# Patient Record
Sex: FEMALE | Race: WHITE | NOT HISPANIC OR LATINO | Employment: OTHER | URBAN - METROPOLITAN AREA
[De-identification: names, ages, dates, MRNs, and addresses within clinical notes are randomized per-mention and may not be internally consistent; named-entity substitution may affect disease eponyms.]

---

## 2023-12-14 DIAGNOSIS — F42.2 MIXED OBSESSIONAL THOUGHTS AND ACTS: ICD-10-CM

## 2023-12-14 DIAGNOSIS — F42.4 SKIN-PICKING DISORDER: ICD-10-CM

## 2023-12-14 RX ORDER — MEMANTINE HYDROCHLORIDE 28 MG/1
28 CAPSULE, EXTENDED RELEASE ORAL DAILY
Qty: 90 CAPSULE | Refills: 0 | Status: SHIPPED | OUTPATIENT
Start: 2023-12-14 | End: 2024-03-04

## 2023-12-14 RX ORDER — MEMANTINE HYDROCHLORIDE 28 MG/1
28 CAPSULE, EXTENDED RELEASE ORAL DAILY
COMMUNITY
Start: 2023-11-20 | End: 2023-12-14 | Stop reason: SDUPTHER

## 2024-01-23 ENCOUNTER — APPOINTMENT (OUTPATIENT)
Dept: BEHAVIORAL HEALTH | Facility: CLINIC | Age: 69
End: 2024-01-23
Payer: COMMERCIAL

## 2024-02-08 ENCOUNTER — TELEPHONE (OUTPATIENT)
Dept: BEHAVIORAL HEALTH | Facility: CLINIC | Age: 69
End: 2024-02-08
Payer: COMMERCIAL

## 2024-02-08 DIAGNOSIS — F41.1 GAD (GENERALIZED ANXIETY DISORDER): ICD-10-CM

## 2024-02-08 RX ORDER — ESCITALOPRAM OXALATE 10 MG/1
TABLET ORAL
COMMUNITY
Start: 2023-03-03 | End: 2024-02-08 | Stop reason: SDUPTHER

## 2024-02-08 RX ORDER — ESCITALOPRAM OXALATE 20 MG/1
20 TABLET ORAL
COMMUNITY
Start: 2011-08-17 | End: 2024-02-08 | Stop reason: SDUPTHER

## 2024-02-10 RX ORDER — ESCITALOPRAM OXALATE 10 MG/1
TABLET ORAL
Qty: 30 TABLET | Refills: 6 | Status: SHIPPED | OUTPATIENT
Start: 2024-02-10 | End: 2024-03-12 | Stop reason: SDUPTHER

## 2024-02-10 RX ORDER — ESCITALOPRAM OXALATE 20 MG/1
20 TABLET ORAL
Qty: 30 TABLET | Refills: 6 | Status: SHIPPED | OUTPATIENT
Start: 2024-02-10 | End: 2024-03-12 | Stop reason: SDUPTHER

## 2024-03-01 DIAGNOSIS — F42.4 SKIN-PICKING DISORDER: ICD-10-CM

## 2024-03-01 DIAGNOSIS — F42.2 MIXED OBSESSIONAL THOUGHTS AND ACTS: ICD-10-CM

## 2024-03-04 RX ORDER — MEMANTINE HYDROCHLORIDE 28 MG/1
CAPSULE, EXTENDED RELEASE ORAL
Qty: 90 CAPSULE | Refills: 0 | Status: SHIPPED | OUTPATIENT
Start: 2024-03-04

## 2024-03-11 ENCOUNTER — OFFICE VISIT (OUTPATIENT)
Dept: BEHAVIORAL HEALTH | Facility: CLINIC | Age: 69
End: 2024-03-11
Payer: COMMERCIAL

## 2024-03-11 DIAGNOSIS — F42.4 SKIN-PICKING DISORDER: ICD-10-CM

## 2024-03-11 DIAGNOSIS — F41.1 GAD (GENERALIZED ANXIETY DISORDER): Primary | ICD-10-CM

## 2024-03-11 PROCEDURE — 99215 OFFICE O/P EST HI 40 MIN: CPT | Performed by: PSYCHIATRY & NEUROLOGY

## 2024-03-11 NOTE — PROGRESS NOTES
"Patient Discussion/Summary    Both patient and staff caregivers were provided information re medications, including benefits, potential risks, and expected and unexpected side effects.      Provider Impressions    DIAGNOSES     OCD                 Depression, NEC       Skin picking       ID moderate                PRESENT FOR APPOINTMENT          Patient     Caregiver Tawnya Cordero, who knows her since October 2023    Sister and LETY Awad Roya    Chief Complaint    An interactive audio and video telecommunication system which permits real time communications between the patient (at the originating site) and provider (at the distant site) was utilized to provide this telehealth service.    Verbal consent was requested and obtained from guardian of CHEKO PACHECO on this date    \"She is no better.\"    \"She will have a whole new staff.\"               SUBJECTIVE     Last seen September 2023     Continued a trial of memantine for skin picking. Titrated over one month to current dose. Has taken memantine ER 28 mg per day for last 6 months    PCP reduced Plavix dose to every other day    No other changes in medications since last visit.      Current medications include:    ASA 81 mg per day  Atorvastatin 40 mg per day  Docusate Na 100 mg per day  Escitalopram 30 mg per day in morning  Memantine ER 28 mg per day in evening  Metformin 1000 mg twice per day  Metoprolol tartrate 25 mg twice per day   Plavix 75 mg every other day in the morning  Fiber choice (gummies), three per day    Vitamin D 2000 IU per day  MVI one per day    Alleve XL cream for hands and particularly one finger    Acetaminophen 650 mg prn, takes several times per week for back pain    Meclizine 25 mg three times per day prn for vertigo symptoms (rarely used)    Zofran 4 mg prn nausea. (none in interim)      Her sister reports, that since the addition of memantine, her skin picking is overall unchanged.    She does not think there has been any " "improvement since taking memantine.    She continues to pick at her cuticles nearly every day, has been picking at them with her other fingers, but also with instruments (paperclips, silverware, etc.).     Since changing her residential agency, she is no better, and perhaps modestly worse.   Seems irritable and angry much of the time.   Much blaming of others when she is not happy, stating that staff caregivers are not helping her, or are aggravating her.   Has \"shorter fuse\" and flares to anger more often and with less obvious frustration.    She has been shouting at her co-workers  many days.   Loses her temper regularly, both at home and at work.   Her day program has begun a reward program with her, in which Melissa can earn a star each day for not doing this yelling.   If she earns a star each day, she is rewarded with a trip with staff into the community for coffee.   She has been working for these rewards for the last couple of weeks.   Surrounding her behavioral challenges at the day program, there was discussion of decreasing her attendance to 4 days per week.  Her sister has resisted this plan, noting the Melissa is likely to spend the day off arguing with her residential staff instead.    There is hope of instituting a similar rewards program at her residence as well.        Agitated episodes include shouting, swearing, and sometimes threats to get others in trouble.  No specific threats to harm others or herself.   Even with this extra and more frequent shouting and agitation, she has not been directly aggressive to others.   Sometimes throws objects in anger, but typically not at others.   Has broken objects (headphones and earbuds; broke portable DVD player).       No SIB except picking at nails, which seems associated with anxiety and not intended to harm himself.     Her sister notes that Melissa's new residential provider ([facility]) is more likely to do things for her, and that as a result, she is " "sometimes doing fewer things for herself, and does not have the structure of her chores at home to help organize her day.   She has 6 staff caregivers through [employer], all female.   There are three primary caregivers, and three \"fill-in\" providers.      She is sleeping more lately, going to bed earlier, sometimes by 6 pm.    Wakes between 4-5 am many days.  Eats breakfast and returns to bed and sleeps until 7 am.   Taking occasional nap at day program.   On weekends, will sleep until 8 am.   Naps on weekend days from 2-5 pm.       Appetite is good to excessive, and she has gained weight in interim.   Less active.  Has been moving about less, and seems to have less stamina lately when walking.     No tears or overt sadness. No recent crying spells.     No other new paranoia or psychotic symptoms.     No change in her ritualistic or perseverative behavior.    Continues ongoing obsessive worry about many issues, including next meals, or when waiting for anything (next staff coming, when can do laundry, etc.). Sometimes will threaten to not take meds, or eat, or attend medical appts when anxious. This is unchanged in interim.     Her Depends are sometimes wet in the night, but no wet beds or clothing.    Continues wearing Depends 24 hours per day. Still needs help with bowel hygiene. Often does not identify to staff that she needs help with wiping.    She has partial dentures, but has been wearing less frequently or not at all lately.   Sister promotes their use, and she typically will wear if there is a planned activity into community, and particularly when she is visited by her sisters.     REVIEW OF SYMPTOMS    Had interim visit with PCP Dr. Shields in December 2023.    Routine check up.  No change in medications.        Has had COVID booster.        Had interim visit with Dr. Rowell in September 2023.   Plavix dose changed to every other day.     No recent reports of dizziness.     Has been seen by dentist in " interim--had one restoration.     Sugars (non fasting) are checked three times per week, have been in 100 to 130 range     Had interim visit with podiatrist, for nail care.     Has had interim ED  visit at [facility], for chest pain in early Feb 2024.   Had had stressful day at work.   Had EKG and labs, all normal.         Had urgent care visit surrounding cough, strep test and COVID tests were all negative.   Had Rx for nebulizer treatments for a week, improved.      Has continued picking and biting her fingernails and cuticles.   Sometimes will pick with a knife or fork.    Sometimes nails are reddened as if infected.   Has sometimes been wearing plastic gloves at work  to deter picking.   Team has also asked behavioral specialist from [facility] to assist in treatment plan     Had right wrist carpal tunnel surgery in July 2022. Had some struggles in post-op period with disability, trouble eating, getting dressed and in/out of bed.   Fewer recent reports of numbness in fingers, with previous treatment with Alleve XL cream.      Some recent reports of back pain over last week.    Some  recent use of OTC acetaminophen and topical cream.     Has been walking less lately; not as active.    No interim ophthal visit, checking for diabetic changes. None seen on last visit. Has small cataracts. Pressure was normal at last visit. Has glasses but does not wear.     Has gained weight since last visit.  Recent weight 213.6 pounds.   Ten pound weight increase since Sept 2023.      Had GI exam in September 2021 surrounding ongoing mild constipation.   Takes docusate and fiber daily, and can take PEG as needed for reported constipation.   Had episode of loose stools earlier in 2024.   Wearing brief full time, it will be soiled if she does not wipe well.    Seems to have a BM every day or other day; without straining or loose stools.   No recent need for additional stool softeners. No recent loose stools or bowel accidents.         PAIN:        As above. Recent reports of temporal headache, and also pain in finger and palm following carpal tunnel surgery.      MEDICAL PROBLEMS     PCP is Dr. Jef Shields at [facility].      He follows her for several medical problems:    NIDDM  Angina, likely MI, coronary artery stent  Hypercholesterolemia  Urinary incontinence   Constipation  Vertigo (has prn meclizine, which she takes rarely).   Dizziness which seems different from vertigo.    Osteoarthritis right knee. Noted to have bone spur on right patella per PCP.     Sugars checked every other day, usually before dinner. Recent range 100-120.     Her tremor remains much better (but not gone) since stopping aripiprazole. Occasionally seen in left hand, and more at rest. Rare recent spilling liquids or dropping food from spoon.     SIDE EFFECTS           Coffee is decaffeinated.     Tremor remains much better (decreased)     Rare recent observation of any movement abn.     No new apparent side effects since starting memantine.    CHANGE IN TREATMENT          Addition of memantine. Titration to current dose.     COMPLIANCE           Some occasional resistance to taking medications, but never actually misses doses     MENTAL STATUS EVALUATION     Per video contact.        BEHAVIOR            Reciprocal interaction     Still more spontaneous speech today compared to baseline.  Less able to take turns in conversation     More irritable today.     MEMORY        poor recent   poor remote     SENSORY       Normal              COMMUNICATION       Conversational   Some ongoing speech dysarthria; mild; unchanged    AFFECT        Not flat today              MOOD       Not overtly sad; but less smiling today.                THOUGHT PROCESS     [place]   Perseverative     THOUGHT CONTENT     Normal; no overt paranoia, but projects much of her distress to behavior of others.            PSYCHOTIC SYMPTOMS     none seen             ORIENTATION      knew day of week      CONCENTRATION      normal     CALCULATION      none     FUND OF KNOWLEDGE     moderate disability      JUDGMENT      posed situations     GAIT       Not evaluated              EPS       Very mild tremor seen today.             AIMS     Did not see any abnormal facial movements on video exam today    LABS REVIEWED     February 2024:    TSH, CBC and CMP normal.   Glucose 111.   A1C 6.5%.  Lipids normal.     February 2023:    CBC and CMP normal, except Ca 10/3. HgbA1C 6.2%.     October 2022:    CBC and CMP grossly normal (glucose mildly elevated)    April 2022:    HgbA1C 6.4%; ALT and AST normal. Ca 10.7, glucose (fasting) 121.     Sept 2021:    HgbA1C 6.3%     June 2021:    Done as inpatient, CBC normal, CMP normal except glucose 144, MG 1.8    April 2021:    Glucose 107, HgbA1C 6.6%, rest of CMP normal    December 2020:    Lipid panel normal    August 2020:    CBC and CMP grossly normal. Lipid panel normal HgbA1C 6.7% (unchanged)    October 2019:    HgbA1C 6.5%. Ca 10.4. Glucose 141. Rest of CMP normal, CBC normal.     April 2019:    CBC normal. CK and Mg normal. Glucose 156, rest of CMP normal. Troponins negative. Amylase and lipase normal.     CXR with mild cardiomegaly, and ? pulmonary edema.     December 2018:    HgbA1C 6.6%, LDL 74     October 2018:    Fecal occult blood negative.    March 2018:    Aripiprazole level 218 (in predicted range for dose of 20 mg per day)  Citalopram level 33 (taking escitalopram so level only partially helpful, but in predicted range for dose)    HgbA1C 6.7%. Glucose 117.       EKG    None in interim    Ziopatch exam in interim, reported as normal    March 2023:    Sinus rhythm. IV conduction delay, some ST-T changes, QTc 438 msec.     Fall 2022 per Dr. Rowell:     Interpretation read from note by sister. IRBBB. QTc 443 msec.     July 2021:    NSR, ? RBBB, QTc 448 msec      March 2021:    NSR, rate 64 bpm, QTc 421 msec, has IV conduction delay, but no BBB    March 2019:    Sinus  rhythm, with RBBB (incomplete). rate 74 bpm; QTc 466 msec.     June 2017:    NSR rate 71 bpm, incomplete RBBB, QTc 443 msec    May 2016:    NSR, rate 70 bpm, QTc 449 msec             November 16, 2015:    Borderline EKG  83 bpm  QTc 456       ASSESSMENT     No apparent benefit from memantine over 6 months for her skin/cuticle picking.       She has now been with newer residential provider for 4 or so months.  Does well with some staff caregivers, but seems to intimidate others.       Sleep unchanged.   Appetite and weight increased.    Energy lower.       She is not overtly paranoid, but is rejection sensitive, and will attribute random conversations she sees among staff caregivers as pertaining to her in negative manner.     Interim labs grossly normal.  HgbA1c is moderately elevated.       Plan to stop memantine, as ineffective for skin picking.        Re-discussed pending [facility] of psychiatrist, discussed transition to clinician at  [facility] program or perhaps Dr. Knapp at [facility].         PLAN      problems treated   f/u requested to prevent relapse   medications renewed/re-ordered     1. Discontinue  memantine trial.       2. No changes in other  medications this visit.     3. Facilitate transition to next psychiatrist in collaboration with her sister/guardian.      TREATMENT TYPE:    Video based [id number] counseling and coordination of care 55 minutes with 50% c/c with staff and sister; addressing signs and symptoms of illness; risks/benefits/side effects of medications; and behavioral approaches to illness.     RETURN:     Transition to new provider as above.

## 2024-03-12 RX ORDER — MEMANTINE HYDROCHLORIDE 21 MG/1
21 CAPSULE, EXTENDED RELEASE ORAL DAILY
Qty: 14 CAPSULE | Refills: 0 | Status: SHIPPED | OUTPATIENT
Start: 2024-03-12

## 2024-03-12 RX ORDER — ESCITALOPRAM OXALATE 20 MG/1
20 TABLET ORAL
Qty: 30 TABLET | Refills: 6 | Status: SHIPPED | OUTPATIENT
Start: 2024-03-12

## 2024-03-12 RX ORDER — MEMANTINE HYDROCHLORIDE 7 MG/1
CAPSULE, EXTENDED RELEASE ORAL
Qty: 14 CAPSULE | Refills: 0 | Status: SHIPPED | OUTPATIENT
Start: 2024-03-12

## 2024-03-12 RX ORDER — MEMANTINE HYDROCHLORIDE 14 MG/1
CAPSULE, EXTENDED RELEASE ORAL
Qty: 14 CAPSULE | Refills: 0 | Status: SHIPPED | OUTPATIENT
Start: 2024-03-12

## 2024-03-12 RX ORDER — ESCITALOPRAM OXALATE 10 MG/1
TABLET ORAL
Qty: 30 TABLET | Refills: 6 | Status: SHIPPED | OUTPATIENT
Start: 2024-03-12